# Patient Record
Sex: MALE | Race: BLACK OR AFRICAN AMERICAN | NOT HISPANIC OR LATINO | URBAN - METROPOLITAN AREA
[De-identification: names, ages, dates, MRNs, and addresses within clinical notes are randomized per-mention and may not be internally consistent; named-entity substitution may affect disease eponyms.]

---

## 2018-01-10 ENCOUNTER — EMERGENCY (EMERGENCY)
Facility: HOSPITAL | Age: 33
LOS: 1 days | Discharge: HOME | End: 2018-01-10
Admitting: EMERGENCY MEDICINE
Payer: SELF-PAY

## 2018-01-10 VITALS
TEMPERATURE: 99 F | HEIGHT: 71 IN | DIASTOLIC BLOOD PRESSURE: 79 MMHG | WEIGHT: 220.02 LBS | RESPIRATION RATE: 16 BRPM | HEART RATE: 56 BPM | SYSTOLIC BLOOD PRESSURE: 126 MMHG | OXYGEN SATURATION: 99 %
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
## 2018-01-10 DIAGNOSIS — M25.512 PAIN IN LEFT SHOULDER: ICD-10-CM

## 2018-01-10 DIAGNOSIS — Z79.2 LONG TERM (CURRENT) USE OF ANTIBIOTICS: ICD-10-CM
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
## 2018-01-10 DIAGNOSIS — M79.1 MYALGIA: ICD-10-CM

## 2018-01-10 PROCEDURE — 96372 THER/PROPH/DIAG INJ SC/IM: CPT

## 2018-01-10 PROCEDURE — 99284 EMERGENCY DEPT VISIT MOD MDM: CPT

## 2018-01-10 PROCEDURE — 99283 EMERGENCY DEPT VISIT LOW MDM: CPT | Mod: 25

## 2018-01-10 RX ORDER — CYCLOBENZAPRINE HYDROCHLORIDE 10 MG/1
1 TABLET, FILM COATED ORAL
Qty: 15 | Refills: 0 | OUTPATIENT
Start: 2018-01-10 | End: 2018-01-14

## 2018-01-10 RX ORDER — IBUPROFEN 200 MG
1 TABLET ORAL
Qty: 15 | Refills: 0 | OUTPATIENT
Start: 2018-01-10 | End: 2018-01-14

## 2018-01-10 RX ORDER — KETOROLAC TROMETHAMINE 30 MG/ML
30 SYRINGE (ML) INJECTION ONCE
Qty: 0 | Refills: 0 | Status: DISCONTINUED | OUTPATIENT
Start: 2018-01-10 | End: 2018-01-10

## 2018-01-10 RX ORDER — DIAZEPAM 5 MG
5 TABLET ORAL ONCE
Qty: 0 | Refills: 0 | Status: DISCONTINUED | OUTPATIENT
Start: 2018-01-10 | End: 2018-01-10

## 2018-01-10 RX ADMIN — Medication 5 MILLIGRAM(S): at 16:56

## 2018-01-10 RX ADMIN — Medication 30 MILLIGRAM(S): at 16:57

## 2018-01-10 NOTE — ED PROVIDER NOTE - MEDICAL DECISION MAKING DETAILS
33 y/o male with left trapezoid pain that is reproducible with palpation. FROM with neck and shoulder without pain or LROM. Pain is likely muscular. Will treat. No xray required. Pain improved post medication. Will send with flex and ibuprofen with PMD fu.

## 2018-01-10 NOTE — ED PROVIDER NOTE - OBJECTIVE STATEMENT
33 y/o male with no PMHx is present with left muscular pain x4 days. Pt states that a ceiling fell on top of him and landed on him. Pt reports pain is located between his neck and his shoulder. Pt reports pain is intermittent and tender to palpation. Pt reports no neck, back, head or shoulder pain with no limitation of use of his neck and shoulder. Pt reports pain has been constant and denies taking any medication to help alleviate the pain. Pt denies the following: previous injury, numbness/tingling to his left arm, skin breaks, bleeding.

## 2018-01-10 NOTE — ED PROVIDER NOTE - PHYSICAL EXAMINATION
Neck: FROM with no spinal or muscle tenderness  Shoulder: FROM with no ttp, with sensation and motor function intact. Able to raise arm above head without pain or limitations.   Trapezoid: ttp with deep palpation on muscle

## 2018-01-10 NOTE — ED ADULT TRIAGE NOTE - OTHER COMPLAINTS
pt c.o L shoulder/neck pain since monday after ceiling panel fell onto his shoulder while at work. no head trauma or loc.

## 2018-01-19 ENCOUNTER — EMERGENCY (EMERGENCY)
Facility: HOSPITAL | Age: 33
LOS: 1 days | Discharge: HOME | End: 2018-01-19
Attending: EMERGENCY MEDICINE | Admitting: EMERGENCY MEDICINE
Payer: SELF-PAY

## 2018-01-19 VITALS
RESPIRATION RATE: 16 BRPM | DIASTOLIC BLOOD PRESSURE: 83 MMHG | WEIGHT: 210.1 LBS | TEMPERATURE: 98 F | OXYGEN SATURATION: 96 % | HEIGHT: 71 IN | HEART RATE: 74 BPM | SYSTOLIC BLOOD PRESSURE: 146 MMHG

## 2018-01-19 DIAGNOSIS — S46.812D STRAIN OF OTHER MUSCLES, FASCIA AND TENDONS AT SHOULDER AND UPPER ARM LEVEL, LEFT ARM, SUBSEQUENT ENCOUNTER: ICD-10-CM

## 2018-01-19 DIAGNOSIS — Z79.2 LONG TERM (CURRENT) USE OF ANTIBIOTICS: ICD-10-CM
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
## 2018-01-19 DIAGNOSIS — S49.92XD UNSPECIFIED INJURY OF LEFT SHOULDER AND UPPER ARM, SUBSEQUENT ENCOUNTER: ICD-10-CM

## 2018-01-19 PROBLEM — Z00.00 ENCOUNTER FOR PREVENTIVE HEALTH EXAMINATION: Status: ACTIVE | Noted: 2018-01-19

## 2018-01-19 PROCEDURE — 73030 X-RAY EXAM OF SHOULDER: CPT | Mod: 26

## 2018-01-19 PROCEDURE — 73030 X-RAY EXAM OF SHOULDER: CPT

## 2018-01-19 PROCEDURE — 99284 EMERGENCY DEPT VISIT MOD MDM: CPT

## 2018-01-19 PROCEDURE — 99283 EMERGENCY DEPT VISIT LOW MDM: CPT | Mod: 25

## 2018-01-19 RX ORDER — IBUPROFEN 200 MG
600 TABLET ORAL ONCE
Qty: 0 | Refills: 0 | Status: COMPLETED | OUTPATIENT
Start: 2018-01-19 | End: 2018-01-19

## 2018-01-19 RX ADMIN — Medication 600 MILLIGRAM(S): at 17:44

## 2018-01-19 RX ADMIN — Medication 600 MILLIGRAM(S): at 16:35

## 2018-01-19 NOTE — ED ADULT TRIAGE NOTE - CHIEF COMPLAINT QUOTE
pt c/o left shoulder pain after something falling on it 1 week ago. pt was seen here given medication but pain has not improved.

## 2018-01-19 NOTE — ED PROVIDER NOTE - MUSCULOSKELETAL, MLM
left shoulder - trapezius pain, with no obvious deformity, FROM at shoulder, no distal nv deficit, no elbow or wrist pain

## 2018-01-19 NOTE — ED PROVIDER NOTE - MEDICAL DECISION MAKING DETAILS
pt with left shoulder strain xray done in ED negative , requires ortho follow up for possible MRI, recommend antiinflammatories - received ortho referral in ED

## 2018-01-19 NOTE — ED ADULT NURSE NOTE - OBJECTIVE STATEMENT
32y M, A&ox3 presents to ED for left shoulder/arm pain x1 week, pt seen previously in er for same s/s reports given flexeril and ibuprofen for pain relief. Pt denies follow up with pcp. No cp, no sob, no n/v. No numbness nor tingling. PT states "it just aint getting better." Denies new heavy lifting nor reinjury. +ROM. Will continue to monitor.

## 2018-01-19 NOTE — ED PROVIDER NOTE - OBJECTIVE STATEMENT
33 y/o m with left shoulder/back pain since 1/10 after having ceiling fall on shoulder.  Pt seen in ED and prescribed flexeril and ibuprofen but pt has not been taking flexeril bc of side effect of drowsiness.  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

## 2018-01-25 ENCOUNTER — APPOINTMENT (OUTPATIENT)
Dept: ORTHOPEDIC SURGERY | Facility: CLINIC | Age: 33
End: 2018-01-25
Payer: OTHER MISCELLANEOUS

## 2018-01-25 PROCEDURE — 99203 OFFICE O/P NEW LOW 30 MIN: CPT

## 2018-01-25 RX ORDER — GABAPENTIN 100 MG/1
100 CAPSULE ORAL 3 TIMES DAILY
Qty: 90 | Refills: 0 | Status: ACTIVE | COMMUNITY
Start: 2018-01-25 | End: 1900-01-01

## 2018-02-22 ENCOUNTER — APPOINTMENT (OUTPATIENT)
Dept: ORTHOPEDIC SURGERY | Facility: CLINIC | Age: 33
End: 2018-02-22
Payer: OTHER MISCELLANEOUS

## 2018-02-22 DIAGNOSIS — S14.3XXA INJURY OF BRACHIAL PLEXUS, INITIAL ENCOUNTER: ICD-10-CM

## 2018-02-22 PROCEDURE — 99213 OFFICE O/P EST LOW 20 MIN: CPT

## 2020-10-23 NOTE — ED ADULT TRIAGE NOTE - CCCP TRG CHIEF CMPLNT
shoulder pain/injury
I have reviewed and confirmed nurses' notes for patient's medications, allergies, medical history, and surgical history.

## 2023-04-03 ENCOUNTER — EMERGENCY (EMERGENCY)
Facility: HOSPITAL | Age: 38
LOS: 1 days | Discharge: HOME | End: 2023-04-03
Admitting: EMERGENCY MEDICINE
Payer: SELF-PAY

## 2023-04-03 VITALS
OXYGEN SATURATION: 96 % | RESPIRATION RATE: 18 BRPM | TEMPERATURE: 100 F | HEART RATE: 70 BPM | HEIGHT: 71 IN | DIASTOLIC BLOOD PRESSURE: 91 MMHG | SYSTOLIC BLOOD PRESSURE: 151 MMHG | WEIGHT: 220.02 LBS

## 2023-04-03 PROCEDURE — 71046 X-RAY EXAM CHEST 2 VIEWS: CPT

## 2023-04-03 PROCEDURE — 73030 X-RAY EXAM OF SHOULDER: CPT

## 2023-04-03 PROCEDURE — 71046 X-RAY EXAM CHEST 2 VIEWS: CPT | Mod: 26

## 2023-04-03 PROCEDURE — 73030 X-RAY EXAM OF SHOULDER: CPT | Mod: 26,RT

## 2023-04-03 PROCEDURE — 99284 EMERGENCY DEPT VISIT MOD MDM: CPT

## 2023-04-03 PROCEDURE — 99284 EMERGENCY DEPT VISIT MOD MDM: CPT | Mod: 25

## 2023-04-03 RX ORDER — CYCLOBENZAPRINE HYDROCHLORIDE 10 MG/1
10 TABLET, FILM COATED ORAL ONCE
Refills: 0 | Status: COMPLETED | OUTPATIENT
Start: 2023-04-03 | End: 2023-04-03

## 2023-04-03 RX ORDER — IBUPROFEN 200 MG
600 TABLET ORAL ONCE
Refills: 0 | Status: COMPLETED | OUTPATIENT
Start: 2023-04-03 | End: 2023-04-03

## 2023-04-03 RX ORDER — CYCLOBENZAPRINE HYDROCHLORIDE 10 MG/1
1 TABLET, FILM COATED ORAL
Qty: 15 | Refills: 0
Start: 2023-04-03

## 2023-04-03 RX ADMIN — CYCLOBENZAPRINE HYDROCHLORIDE 10 MILLIGRAM(S): 10 TABLET, FILM COATED ORAL at 22:01

## 2023-04-03 RX ADMIN — Medication 600 MILLIGRAM(S): at 22:01
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
## 2023-04-03 NOTE — ED PROVIDER NOTE - PHYSICAL EXAMINATION
Vitals reviewed  Gen: comfortable appearing at rest, in nad, speaking in full sentences  Skin: wwp, no rash/lesions  HEENT: ncat, eomi, mmm  Neck/Back: no midline ttp/step off, no paraspinal ttp, + ttp over R trapezius, ROM not limited    CV: rrr, no audible m/r/g  Resp: symmetrical expansion, ctab, no w/r/r  Ext: pain w/ ROM R shoulder and limited abduction to approx 100degrees, otherwise FROM throughout, no peripheral edema, 5/5 strength all ext, SILT equal throughout, distal pulses 2+  Neuro: alert/oriented, no focal deficits, steady gait without assistance
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
## 2023-04-03 NOTE — ED PROVIDER NOTE - PATIENT PORTAL LINK FT
You can access the FollowMyHealth Patient Portal offered by [facility] by registering at the following website: [email or web address]. By joining [employer]’s FollowMyHealth portal, you will also be able to view your health information using other applications (apps) compatible with our system.

## 2023-04-05 DIAGNOSIS — M54.6 PAIN IN THORACIC SPINE: ICD-10-CM

## 2023-04-05 DIAGNOSIS — M25.511 PAIN IN RIGHT SHOULDER: ICD-10-CM
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
## 2023-09-12 NOTE — ED PROVIDER NOTE - MUSCULOSKELETAL [+], MLM
Detail Level: Zone
Initiate Treatment: Doxycycline 100mg daily
Plan: If not improved with doxycycline and topicals will consider accutane
Continue Regimen: Tretinoin 0.025% qhs \\nClindamycin lotion qam
left trapezoid pain

## 2024-03-05 NOTE — ED PROVIDER NOTE - EXITCARE/DISCHARGE INSTRUCTIONS
This patient was seen for annual visit today and he NEEDS F?U- he has colonoscopy scheduled due to positive fit kit but he is past due for routine with you and needs DM addressed- very uncontrolled. Also to note he report that he was screen for Sleep apnea but never had ?? Court scheduled with you next available- just a heads up [employer] and print the 'Prescriptions from this Visit' Report

## 2024-09-06 NOTE — ED PROVIDER NOTE - NSTIMEPROVIDERCAREINITIATE_GEN_ER
Eat healthy foods you enjoy. Apixaban/Eliquis DOES NOT have a special diet. Limit your alcohol intake. 10-Ridge-2018 16:01